# Patient Record
Sex: MALE | Race: BLACK OR AFRICAN AMERICAN | NOT HISPANIC OR LATINO
[De-identification: names, ages, dates, MRNs, and addresses within clinical notes are randomized per-mention and may not be internally consistent; named-entity substitution may affect disease eponyms.]

---

## 2022-03-11 ENCOUNTER — APPOINTMENT (OUTPATIENT)
Dept: UROLOGY | Facility: CLINIC | Age: 72
End: 2022-03-11
Payer: MEDICARE

## 2022-03-11 DIAGNOSIS — Z78.9 OTHER SPECIFIED HEALTH STATUS: ICD-10-CM

## 2022-03-11 PROCEDURE — 99204 OFFICE O/P NEW MOD 45 MIN: CPT

## 2022-03-17 NOTE — ASSESSMENT
[FreeTextEntry1] : The natural history of prostate cancer and ongoing controversy regarding screening and potential treatment outcomes of prostate cancer has been discussed with the patient. The meaning of a false positive PSA and a false negative PSA has been discussed. He indicates understanding of the limitations of this screening test \par REviewed ptions continue  surveillance , biopsy or mri \par Risks and benefits reviewed\par will get mri to eval for targetable lesion

## 2022-03-17 NOTE — HISTORY OF PRESENT ILLNESS
[FreeTextEntry1] : Elevated PSA \par Patient is here with an elevated PSA. He has no personal history and no family history of prostate cancer.He has no prior genitourinary history of hematuria, hematospermia, prostatitis, UTI, erectile dysfunction, urolithiasis, epididymal orchitis. \par psa 14\par No dysuria or hematuria\par \par

## 2022-03-17 NOTE — PHYSICAL EXAM
[General Appearance - Well Developed] : well developed [Normal Appearance] : normal appearance [General Appearance - Well Nourished] : well nourished [Well Groomed] : well groomed [General Appearance - In No Acute Distress] : no acute distress [Edema] : no peripheral edema [Respiration, Rhythm And Depth] : normal respiratory rhythm and effort [Exaggerated Use Of Accessory Muscles For Inspiration] : no accessory muscle use [Abdomen Soft] : soft [Abdomen Tenderness] : non-tender [Costovertebral Angle Tenderness] : no ~M costovertebral angle tenderness [Urethral Meatus] : meatus normal [Urinary Bladder Findings] : the bladder was normal on palpation [Scrotum] : the scrotum was normal [Testes Mass (___cm)] : there were no testicular masses [No Prostate Nodules] : no prostate nodules [Normal Station and Gait] : the gait and station were normal for the patient's age [] : no rash [No Focal Deficits] : no focal deficits [Oriented To Time, Place, And Person] : oriented to person, place, and time [Affect] : the affect was normal [Mood] : the mood was normal [Not Anxious] : not anxious [No Palpable Adenopathy] : no palpable adenopathy

## 2022-10-18 ENCOUNTER — NON-APPOINTMENT (OUTPATIENT)
Age: 72
End: 2022-10-18

## 2022-10-20 ENCOUNTER — APPOINTMENT (OUTPATIENT)
Dept: UROLOGY | Facility: CLINIC | Age: 72
End: 2022-10-20

## 2022-10-20 VITALS
SYSTOLIC BLOOD PRESSURE: 151 MMHG | DIASTOLIC BLOOD PRESSURE: 103 MMHG | WEIGHT: 143 LBS | OXYGEN SATURATION: 98 % | BODY MASS INDEX: 22.71 KG/M2 | TEMPERATURE: 97.3 F | HEART RATE: 75 BPM | HEIGHT: 66.5 IN

## 2022-10-20 VITALS — DIASTOLIC BLOOD PRESSURE: 75 MMHG | HEART RATE: 69 BPM | SYSTOLIC BLOOD PRESSURE: 129 MMHG

## 2022-10-20 PROCEDURE — 99214 OFFICE O/P EST MOD 30 MIN: CPT

## 2022-10-24 ENCOUNTER — NON-APPOINTMENT (OUTPATIENT)
Age: 72
End: 2022-10-24

## 2022-10-25 NOTE — ASSESSMENT
[FreeTextEntry1] : mri reviewed with pt\par The risks of the biopsy procedure including but not limited to sepsis, hemorrhage,hematuria, hematochezia rectal pain, hematospermia, false positives andfalse negatives, possible need of repeat biopsies, and rectal injury were all discussed, understood, and accepted by the patient.\par \par Will schedule for fusion biopsy\par

## 2022-10-25 NOTE — HISTORY OF PRESENT ILLNESS
[FreeTextEntry1] : Elevated PSA \par Patient is here with an elevated PSA. He has no personal history and no family history of prostate cancer.He has no prior genitourinary history of hematuria, hematospermia, prostatitis, UTI, erectile dysfunction, urolithiasis, epididymal orchitis. \par psa 14\par No dysuria or hematuria\par \par mri pirad 5 lesion \par \[place] \par \par

## 2022-10-27 LAB
BACTERIA UR CULT: NORMAL
PSA SERPL-MCNC: 22.2 NG/ML

## 2022-10-28 ENCOUNTER — NON-APPOINTMENT (OUTPATIENT)
Age: 72
End: 2022-10-28

## 2022-10-31 ENCOUNTER — APPOINTMENT (OUTPATIENT)
Dept: UROLOGY | Facility: CLINIC | Age: 72
End: 2022-10-31

## 2022-10-31 ENCOUNTER — OUTPATIENT (OUTPATIENT)
Dept: OUTPATIENT SERVICES | Facility: HOSPITAL | Age: 72
LOS: 1 days | End: 2022-10-31
Payer: COMMERCIAL

## 2022-10-31 VITALS — HEART RATE: 77 BPM | SYSTOLIC BLOOD PRESSURE: 153 MMHG | DIASTOLIC BLOOD PRESSURE: 94 MMHG

## 2022-10-31 PROCEDURE — 55700: CPT

## 2022-10-31 PROCEDURE — 76872 US TRANSRECTAL: CPT | Mod: 26

## 2022-10-31 PROCEDURE — 55700: CPT | Mod: 22

## 2022-10-31 PROCEDURE — 76377 3D RENDER W/INTRP POSTPROCES: CPT | Mod: 26

## 2022-10-31 PROCEDURE — 76942 ECHO GUIDE FOR BIOPSY: CPT

## 2022-11-01 DIAGNOSIS — R97.20 ELEVATED PROSTATE SPECIFIC ANTIGEN [PSA]: ICD-10-CM

## 2022-11-30 ENCOUNTER — APPOINTMENT (OUTPATIENT)
Dept: UROLOGY | Facility: CLINIC | Age: 72
End: 2022-11-30

## 2022-11-30 VITALS
WEIGHT: 145 LBS | OXYGEN SATURATION: 99 % | TEMPERATURE: 98.5 F | SYSTOLIC BLOOD PRESSURE: 137 MMHG | HEART RATE: 80 BPM | DIASTOLIC BLOOD PRESSURE: 88 MMHG | HEIGHT: 66 IN | BODY MASS INDEX: 23.3 KG/M2

## 2022-11-30 PROCEDURE — 99214 OFFICE O/P EST MOD 30 MIN: CPT

## 2022-11-30 NOTE — HISTORY OF PRESENT ILLNESS
[FreeTextEntry1] : s/p prostate biopsy \par doing well\par no voiding complaints \par no fever \par path 3+4 in fusion bx

## 2022-11-30 NOTE — ASSESSMENT
[FreeTextEntry1] : We had a long discussion with the patient explaining the diagnosis of prostate cancer and the meaning of his Gillian Grade. We discussed all options of therapy, including observation, radiation therapy, endocrine therapy, and radical surgical extirpation.\par We discussed tumor progression and the natural course of prostate cancer, including metastases, especially to bone, and that observation would allow his prostate cancer to progress. We discussed radiation therapy, both external beam and interstitial radioactive seeds, and the morbidity involved, including cystitis, proctitis, urinary and incontinence and erectile dysfunction. We discussed endocrine therapy, that it would likely slow but not stop cancer progression, and the results of androgen ablation, including but not limited to bone density loss, muscle mass loss, changes in libido, mood, energy and depression. We discussed surgical therapy, including surgical techniques, outcomes and potential need for subsequent additional radiation therapy if positive surgical margins, and complications including infection and blood loss, and long term complications including urinary incontinence and erectile dysfunction. whole body bone scans\par  \par Explained to the patient that based on his grade of prostate cancer and overall health status would recommend either radiation therapy or surgery.\par pt reports he will not consider surgery will see rad on to review\par  \par Gave him material from [facility] for review. Appropriate referral were made. reviewed importance of following up with all referrals and exams\par  \par Greater than 50% of the encounter time was spent counseling the patient and I have spent 30 minutes face to face time with the patient. All questions were answered.\par

## 2023-02-08 ENCOUNTER — APPOINTMENT (OUTPATIENT)
Dept: UROLOGY | Facility: CLINIC | Age: 73
End: 2023-02-08
Payer: COMMERCIAL

## 2023-02-08 VITALS
BODY MASS INDEX: 21.69 KG/M2 | HEIGHT: 66 IN | TEMPERATURE: 97.3 F | SYSTOLIC BLOOD PRESSURE: 139 MMHG | WEIGHT: 135 LBS | DIASTOLIC BLOOD PRESSURE: 88 MMHG | OXYGEN SATURATION: 97 % | HEART RATE: 69 BPM

## 2023-02-08 PROCEDURE — 99214 OFFICE O/P EST MOD 30 MIN: CPT | Mod: 25

## 2023-02-08 PROCEDURE — A4648: CPT

## 2023-02-08 PROCEDURE — 55876 PLACE RT DEVICE/MARKER PROS: CPT

## 2023-02-08 RX ORDER — BICALUTAMIDE 50 MG/1
50 TABLET ORAL DAILY
Qty: 30 | Refills: 0 | Status: ACTIVE | COMMUNITY
Start: 2023-02-08 | End: 1900-01-01

## 2023-02-08 NOTE — HISTORY OF PRESENT ILLNESS
[FreeTextEntry1] : s/p prostate biopsy \par doing well\par no voiding complaints \par no fever \par path 3+4 in fusion bx psa22.2\par \par saw dr no reviewed xrt \par xray femue humerus ordered but pt did not do

## 2023-02-08 NOTE — ASSESSMENT
[FreeTextEntry1] : xrays reordered info given to complete\par We discussed the risk of ADT including decreased libido, hot flashes, possible loss of bone density or muscle atrophy. I've explained to him that there are therapeutic advantage is in terms of cancer control better evidence based when receiving neoadjuvant androgen deprivation therapy versus radiation therapy alone.\par will start bicalutamide and f/u for injection\par fiducials placed today \par reviewed importance f/u with ordered tests/appts

## 2023-02-14 ENCOUNTER — OUTPATIENT (OUTPATIENT)
Dept: OUTPATIENT SERVICES | Facility: HOSPITAL | Age: 73
LOS: 1 days | End: 2023-02-14
Payer: COMMERCIAL

## 2023-02-14 ENCOUNTER — APPOINTMENT (OUTPATIENT)
Dept: RADIOLOGY | Facility: IMAGING CENTER | Age: 73
End: 2023-02-14
Payer: COMMERCIAL

## 2023-02-14 DIAGNOSIS — C61 MALIGNANT NEOPLASM OF PROSTATE: ICD-10-CM

## 2023-02-14 PROCEDURE — 73552 X-RAY EXAM OF FEMUR 2/>: CPT | Mod: 26,RT

## 2023-02-14 PROCEDURE — 73060 X-RAY EXAM OF HUMERUS: CPT | Mod: 26,RT

## 2023-02-14 PROCEDURE — 73060 X-RAY EXAM OF HUMERUS: CPT

## 2023-02-14 PROCEDURE — 73552 X-RAY EXAM OF FEMUR 2/>: CPT

## 2023-02-22 ENCOUNTER — APPOINTMENT (OUTPATIENT)
Dept: UROLOGY | Facility: CLINIC | Age: 73
End: 2023-02-22
Payer: MEDICARE

## 2023-02-22 VITALS
HEART RATE: 70 BPM | HEIGHT: 66 IN | WEIGHT: 135 LBS | SYSTOLIC BLOOD PRESSURE: 145 MMHG | DIASTOLIC BLOOD PRESSURE: 93 MMHG | TEMPERATURE: 97.2 F | OXYGEN SATURATION: 96 % | BODY MASS INDEX: 21.69 KG/M2

## 2023-02-22 PROCEDURE — 96402 CHEMO HORMON ANTINEOPL SQ/IM: CPT

## 2023-02-22 RX ORDER — LEUPROLIDE ACETATE 22.5 MG/.375ML
22.5 INJECTION, SUSPENSION, EXTENDED RELEASE SUBCUTANEOUS
Qty: 0 | Refills: 0 | Status: COMPLETED | OUTPATIENT
Start: 2023-02-22

## 2023-02-22 RX ADMIN — LEUPROLIDE ACETATE 0 MG: KIT SUBCUTANEOUS at 00:00

## 2023-03-29 ENCOUNTER — NON-APPOINTMENT (OUTPATIENT)
Age: 73
End: 2023-03-29

## 2023-04-05 ENCOUNTER — APPOINTMENT (OUTPATIENT)
Dept: UROLOGY | Facility: CLINIC | Age: 73
End: 2023-04-05
Payer: MEDICARE

## 2023-04-05 VITALS
HEIGHT: 66 IN | OXYGEN SATURATION: 97 % | BODY MASS INDEX: 21.69 KG/M2 | WEIGHT: 135 LBS | DIASTOLIC BLOOD PRESSURE: 77 MMHG | TEMPERATURE: 96.5 F | SYSTOLIC BLOOD PRESSURE: 123 MMHG | HEART RATE: 76 BPM

## 2023-04-05 PROCEDURE — 76942 ECHO GUIDE FOR BIOPSY: CPT | Mod: 59

## 2023-04-05 PROCEDURE — 55874Z: CUSTOM

## 2023-05-24 ENCOUNTER — APPOINTMENT (OUTPATIENT)
Dept: UROLOGY | Facility: CLINIC | Age: 73
End: 2023-05-24
Payer: MEDICARE

## 2023-05-24 VITALS
TEMPERATURE: 97.2 F | SYSTOLIC BLOOD PRESSURE: 146 MMHG | HEART RATE: 66 BPM | DIASTOLIC BLOOD PRESSURE: 90 MMHG | BODY MASS INDEX: 21.69 KG/M2 | HEIGHT: 66 IN | OXYGEN SATURATION: 98 % | WEIGHT: 135 LBS

## 2023-05-24 PROCEDURE — 96402 CHEMO HORMON ANTINEOPL SQ/IM: CPT

## 2023-05-24 PROCEDURE — 99213 OFFICE O/P EST LOW 20 MIN: CPT | Mod: 25

## 2023-05-24 RX ADMIN — LEUPROLIDE ACETATE 0 MG: KIT SUBCUTANEOUS at 00:00

## 2023-05-24 NOTE — ASSESSMENT
[FreeTextEntry1] : pt is going well \par completing xrt \par eligard given today \par given high risk psa >22 favor continuing adt \par \par reviewed management of ed \par will defer for now

## 2023-05-24 NOTE — HISTORY OF PRESENT ILLNESS
[FreeTextEntry1] : cc fu\par undergoing xrt with adt  \par doing well\par no voiding complaints \par no fever \par path 3+4 in fusion bx psa22.2\par \par poor erections

## 2023-08-24 ENCOUNTER — APPOINTMENT (OUTPATIENT)
Dept: UROLOGY | Facility: CLINIC | Age: 73
End: 2023-08-24
Payer: MEDICARE

## 2023-08-24 VITALS
HEART RATE: 71 BPM | TEMPERATURE: 95.6 F | SYSTOLIC BLOOD PRESSURE: 139 MMHG | DIASTOLIC BLOOD PRESSURE: 87 MMHG | BODY MASS INDEX: 20.89 KG/M2 | OXYGEN SATURATION: 97 % | WEIGHT: 130 LBS | HEIGHT: 66 IN

## 2023-08-24 DIAGNOSIS — N52.35 ERECTILE DYSFUNCTION FOLLOWING RADIATION THERAPY: ICD-10-CM

## 2023-08-24 PROCEDURE — 96402 CHEMO HORMON ANTINEOPL SQ/IM: CPT

## 2023-08-24 PROCEDURE — 99214 OFFICE O/P EST MOD 30 MIN: CPT | Mod: 25

## 2023-08-30 NOTE — ASSESSMENT
[FreeTextEntry1] : Will try sildenafil side effects reviewed More common reviewed- redness or warmth in your face, neck, or chest; cold symptoms such as stuffy nose, sneezing, or sore throat; headache; memory problems; diarrhea, upset stomach; or muscle pain, back pain. Stop immediately and got to ER for changes in vision or sudden vision loss; ringing in your ears, or sudden hearing loss; chest pain or heavy feeling, pain spreading to the arm or shoulder, nausea, sweating, general ill feeling; irregular heartbeat; shortness of breath, swelling in your hands or feet; seizure (convulsions); feeling light-headed, fainting; or penis erection that is painful or lasts 4 hours or longer

## 2023-08-30 NOTE — HISTORY OF PRESENT ILLNESS
[FreeTextEntry1] : cc fu undergoing xrt with adt   doing well no voiding complaints  no fever  path 3+4 in fusion bx psa22.2  poor erections

## 2023-11-28 RX ORDER — LEUPROLIDE ACETATE 45 MG/.375ML
45 INJECTION, SUSPENSION, EXTENDED RELEASE SUBCUTANEOUS
Qty: 1 | Refills: 0 | Status: COMPLETED | OUTPATIENT
Start: 2023-11-28 | End: 2023-11-29

## 2023-11-29 ENCOUNTER — NON-APPOINTMENT (OUTPATIENT)
Age: 73
End: 2023-11-29

## 2023-11-29 ENCOUNTER — APPOINTMENT (OUTPATIENT)
Dept: UROLOGY | Facility: CLINIC | Age: 73
End: 2023-11-29
Payer: MEDICARE

## 2023-11-29 VITALS
SYSTOLIC BLOOD PRESSURE: 142 MMHG | TEMPERATURE: 96.8 F | HEIGHT: 66 IN | HEART RATE: 80 BPM | OXYGEN SATURATION: 98 % | WEIGHT: 130 LBS | BODY MASS INDEX: 20.89 KG/M2 | DIASTOLIC BLOOD PRESSURE: 84 MMHG

## 2023-11-29 DIAGNOSIS — R97.20 ELEVATED PROSTATE, SPECIFIC ANTIGEN [PSA]: ICD-10-CM

## 2023-11-29 DIAGNOSIS — N52.9 MALE ERECTILE DYSFUNCTION, UNSPECIFIED: ICD-10-CM

## 2023-11-29 DIAGNOSIS — C61 MALIGNANT NEOPLASM OF PROSTATE: ICD-10-CM

## 2023-11-29 PROCEDURE — 99213 OFFICE O/P EST LOW 20 MIN: CPT | Mod: 25

## 2023-11-29 PROCEDURE — 51700 IRRIGATION OF BLADDER: CPT

## 2023-11-29 RX ORDER — LEUPROLIDE ACETATE 45 MG/.375ML
45 INJECTION, SUSPENSION, EXTENDED RELEASE SUBCUTANEOUS
Refills: 0 | Status: COMPLETED | OUTPATIENT
Start: 2023-11-29

## 2023-11-29 RX ORDER — LEUPROLIDE ACETATE 45 MG/.375ML
45 INJECTION, SUSPENSION, EXTENDED RELEASE SUBCUTANEOUS
Qty: 1 | Refills: 0 | Status: COMPLETED | OUTPATIENT
Start: 2023-11-29 | End: 2023-11-29

## 2023-11-29 RX ORDER — SILDENAFIL 100 MG/1
100 TABLET, FILM COATED ORAL
Qty: 6 | Refills: 5 | Status: ACTIVE | COMMUNITY
Start: 2023-08-24 | End: 1900-01-01

## 2023-12-06 PROBLEM — R97.20 ELEVATED PSA: Status: ACTIVE | Noted: 2022-03-11

## 2023-12-06 PROBLEM — C61 CANCER OF PROSTATE: Status: ACTIVE | Noted: 2022-11-30

## 2023-12-06 PROBLEM — C61 PROSTATE CANCER: Status: ACTIVE | Noted: 2023-02-22

## 2023-12-07 LAB — PSA SERPL-MCNC: 0.02 NG/ML

## 2024-05-29 ENCOUNTER — APPOINTMENT (OUTPATIENT)
Dept: UROLOGY | Facility: CLINIC | Age: 74
End: 2024-05-29

## 2024-08-09 ENCOUNTER — APPOINTMENT (OUTPATIENT)
Dept: UROLOGY | Facility: CLINIC | Age: 74
End: 2024-08-09

## 2024-08-09 PROCEDURE — 99214 OFFICE O/P EST MOD 30 MIN: CPT

## 2024-08-09 PROCEDURE — G2211 COMPLEX E/M VISIT ADD ON: CPT

## 2024-08-09 NOTE — HISTORY OF PRESENT ILLNESS
[FreeTextEntry1] : 11/29/2023 cc fu completed xrt with adt doing well no voiding complaints no fever path 3+4 in fusion bx psa22.2  poor erections  08/09/2024 cc f/u visit  73 year old male presents for a f/u visit. Pt reports he recently came back from [place]. He denies any frequency, dysuria, or urgency. Pt reports poor erections and states his hot flashes are the same.

## 2024-08-09 NOTE — END OF VISIT
[FreeTextEntry4] : This note was written by Leidy Penn on 08/09/2024 actively solely Toney Jiméenz M.D. I, Leidy Penn, am scribing for and in the presence of Toney Jiménez M.D. in the following sections HISTORY OF PRESENT ILLNESS, PAST MEDICAL/FAMILY/SOCIAL HISTORY; REVIEW OF SYSTEMS; VITAL SIGNS; PHYSICAL EXAM; ASSESSMENT/PLAN.   All medical record entries made by this scribe at my, Toney Jiménez M.D. direction and personally dictated by me on 08/09/2024. I personally performed the services described in the documentation, reviewed the documentation recorded by the scribe in my presence, and it accurately and completely records my words and actions.

## 2025-01-09 ENCOUNTER — APPOINTMENT (OUTPATIENT)
Dept: UROLOGY | Facility: CLINIC | Age: 75
End: 2025-01-09

## 2025-02-21 ENCOUNTER — APPOINTMENT (OUTPATIENT)
Dept: UROLOGY | Facility: CLINIC | Age: 75
End: 2025-02-21

## 2025-02-21 VITALS
TEMPERATURE: 96.6 F | OXYGEN SATURATION: 93 % | SYSTOLIC BLOOD PRESSURE: 138 MMHG | HEART RATE: 76 BPM | DIASTOLIC BLOOD PRESSURE: 85 MMHG

## 2025-02-21 DIAGNOSIS — C61 MALIGNANT NEOPLASM OF PROSTATE: ICD-10-CM

## 2025-02-21 DIAGNOSIS — N52.9 MALE ERECTILE DYSFUNCTION, UNSPECIFIED: ICD-10-CM

## 2025-02-21 PROCEDURE — G2211 COMPLEX E/M VISIT ADD ON: CPT

## 2025-02-21 PROCEDURE — 99213 OFFICE O/P EST LOW 20 MIN: CPT

## 2025-02-22 LAB
APPEARANCE: CLEAR
BACTERIA UR CULT: NORMAL
BACTERIA: NEGATIVE /HPF
BILIRUBIN URINE: NEGATIVE
BLOOD URINE: NEGATIVE
CAST: 0 /LPF
COLOR: YELLOW
EPITHELIAL CELLS: 0 /HPF
GLUCOSE QUALITATIVE U: NEGATIVE MG/DL
KETONES URINE: NEGATIVE MG/DL
LEUKOCYTE ESTERASE URINE: NEGATIVE
MICROSCOPIC-UA: NORMAL
NITRITE URINE: NEGATIVE
PH URINE: 6
PROTEIN URINE: NEGATIVE MG/DL
PSA SERPL-MCNC: 0.05 NG/ML
RED BLOOD CELLS URINE: 1 /HPF
SPECIFIC GRAVITY URINE: 1.01
UROBILINOGEN URINE: 0.2 MG/DL
WHITE BLOOD CELLS URINE: 0 /HPF

## 2025-06-25 ENCOUNTER — APPOINTMENT (OUTPATIENT)
Dept: UROLOGY | Facility: CLINIC | Age: 75
End: 2025-06-25